# Patient Record
Sex: FEMALE | Race: BLACK OR AFRICAN AMERICAN | NOT HISPANIC OR LATINO | URBAN - METROPOLITAN AREA
[De-identification: names, ages, dates, MRNs, and addresses within clinical notes are randomized per-mention and may not be internally consistent; named-entity substitution may affect disease eponyms.]

---

## 2024-02-21 ENCOUNTER — OFFICE VISIT (OUTPATIENT)
Dept: OBSTETRICS AND GYNECOLOGY | Facility: HOSPITAL | Age: 29
End: 2024-02-21
Payer: MEDICAID

## 2024-02-21 ENCOUNTER — LAB (OUTPATIENT)
Dept: LAB | Facility: LAB | Age: 29
End: 2024-02-21
Payer: MEDICAID

## 2024-02-21 VITALS — DIASTOLIC BLOOD PRESSURE: 81 MMHG | SYSTOLIC BLOOD PRESSURE: 123 MMHG | WEIGHT: 129 LBS

## 2024-02-21 DIAGNOSIS — R10.31 RIGHT LOWER QUADRANT PAIN: ICD-10-CM

## 2024-02-21 DIAGNOSIS — D25.0 INTRAMURAL AND SUBMUCOUS LEIOMYOMA OF UTERUS: ICD-10-CM

## 2024-02-21 DIAGNOSIS — Z00.00 HEALTHCARE MAINTENANCE: Primary | ICD-10-CM

## 2024-02-21 DIAGNOSIS — D25.1 INTRAMURAL AND SUBMUCOUS LEIOMYOMA OF UTERUS: ICD-10-CM

## 2024-02-21 LAB
BASOPHILS # BLD AUTO: 0.03 X10*3/UL (ref 0–0.1)
BASOPHILS NFR BLD AUTO: 0.7 %
EOSINOPHIL # BLD AUTO: 0.06 X10*3/UL (ref 0–0.7)
EOSINOPHIL NFR BLD AUTO: 1.4 %
ERYTHROCYTE [DISTWIDTH] IN BLOOD BY AUTOMATED COUNT: 13.2 % (ref 11.5–14.5)
HCT VFR BLD AUTO: 40 % (ref 36–46)
HGB BLD-MCNC: 13.4 G/DL (ref 12–16)
IMM GRANULOCYTES # BLD AUTO: 0 X10*3/UL (ref 0–0.7)
IMM GRANULOCYTES NFR BLD AUTO: 0 % (ref 0–0.9)
LYMPHOCYTES # BLD AUTO: 2.13 X10*3/UL (ref 1.2–4.8)
LYMPHOCYTES NFR BLD AUTO: 50.7 %
MCH RBC QN AUTO: 30.5 PG (ref 26–34)
MCHC RBC AUTO-ENTMCNC: 33.5 G/DL (ref 32–36)
MCV RBC AUTO: 91 FL (ref 80–100)
MONOCYTES # BLD AUTO: 0.5 X10*3/UL (ref 0.1–1)
MONOCYTES NFR BLD AUTO: 11.9 %
NEUTROPHILS # BLD AUTO: 1.48 X10*3/UL (ref 1.2–7.7)
NEUTROPHILS NFR BLD AUTO: 35.3 %
NRBC BLD-RTO: 0 /100 WBCS (ref 0–0)
PLATELET # BLD AUTO: 247 X10*3/UL (ref 150–450)
RBC # BLD AUTO: 4.4 X10*6/UL (ref 4–5.2)
WBC # BLD AUTO: 4.2 X10*3/UL (ref 4.4–11.3)

## 2024-02-21 PROCEDURE — 36415 COLL VENOUS BLD VENIPUNCTURE: CPT

## 2024-02-21 PROCEDURE — 99214 OFFICE O/P EST MOD 30 MIN: CPT | Performed by: NURSE PRACTITIONER

## 2024-02-21 PROCEDURE — 85025 COMPLETE CBC W/AUTO DIFF WBC: CPT

## 2024-02-21 PROCEDURE — 99204 OFFICE O/P NEW MOD 45 MIN: CPT | Performed by: NURSE PRACTITIONER

## 2024-02-21 RX ORDER — NAPROXEN 500 MG/1
500 TABLET ORAL EVERY 12 HOURS PRN
COMMUNITY
Start: 2024-02-18 | End: 2024-03-03

## 2024-02-21 ASSESSMENT — LIFESTYLE VARIABLES: HOW OFTEN DO YOU HAVE A DRINK CONTAINING ALCOHOL: 2-3 TIMES A WEEK

## 2024-02-21 ASSESSMENT — PAIN SCALES - GENERAL: PAINLEVEL: 7

## 2024-02-21 ASSESSMENT — ENCOUNTER SYMPTOMS: ABDOMINAL PAIN: 1

## 2024-02-21 NOTE — SIGNIFICANT EVENT
Patient questioned regarding alcohol use as it was noted in ER visit patient was being worked up for pancreatitis due to heavy alcohol use.  Patient's partner reports she drinks a lot and patient states it is only on the weekend.

## 2024-02-21 NOTE — PROGRESS NOTES
Subjective   Patient ID: Cristela Dumont is a 28 y.o. female who presents for Abdominal Pain (Pt complains of pelvic pain/Pt rates off naproxen pain is a constant 8-9/10, cramping/aching, began years prior, but over last week has increased drastically/Last pap unknown).  Abdominal Pain      Patient is here with her partner for a complaint of pelvic pain.    Patient is a 28-year-old G0, P0 who has not had regular GYN care for care with a primary provider.  Her health care has been through the emergency room only.    Patient reports having abdominal pain has been going on for years and years but she has lower right quadrant pain that has been intensifying recently.    History significant for menarche at age 20 with irregular periods throughout her life    Patient has never had vaginal penetrative intercourse.  Her partner is female.  She has used tampons.    ER report from March 2023 showed complaint of epigastric pain and a CT scan that identified normal reproductive organs.  Patient states she was told at that time she had 3 fallopian tubes 2 on the right side 1 on the left.    Patient states normal urination and bowel movements.    Naproxen being used to help decrease the pain.  Review of Systems   Gastrointestinal:  Positive for abdominal pain.       Objective   Physical Exam  Exam conducted with a chaperone present.   Constitutional:       Appearance: She is normal weight.   HENT:      Head: Normocephalic.   Cardiovascular:      Rate and Rhythm: Normal rate and regular rhythm.      Heart sounds: Normal heart sounds.   Pulmonary:      Effort: Pulmonary effort is normal.      Breath sounds: Normal breath sounds.   Abdominal:      General: Bowel sounds are normal.      Palpations: Abdomen is soft.      Tenderness: There is guarding.      Comments: Right lower quadrant tenderness to palpation.  Guarding noted.   Genitourinary:     Exam position: Lithotomy position.      Labia:         Right: No rash or tenderness.          Left: No rash or tenderness.       Adnexa:         Right: Tenderness present.         Left: No tenderness.        Comments: Unable to place small speculum in vagina    Able to place 1 finger in vagina for examination.  Exquisitely tender in lower right quadrant.  Musculoskeletal:      Cervical back: Normal range of motion.   Neurological:      Mental Status: She is alert.   Psychiatric:         Mood and Affect: Mood normal.         Behavior: Behavior normal.         Thought Content: Thought content normal.         Judgment: Judgment normal.         Assessment/Plan   Problem List Items Addressed This Visit    None  Visit Diagnoses         Codes    Healthcare maintenance    -  Primary Z00.00    Relevant Orders    Referral to Primary Care    Right lower quadrant pain     R10.31    Relevant Orders    US PELVIS TRANSABDOMINAL WITH TRANSVAGINAL    CBC and Auto Differential    Intramural and submucous leiomyoma of uterus     D25.1, D25.0          Patient will have CBC and differential drawn today and get pelvic ultrasound is soon as possible.  Provider will review findings with MD partner for future plan.       SAMIRA Hill-CNP 02/21/24 10:50 AM